# Patient Record
Sex: FEMALE | Race: WHITE | NOT HISPANIC OR LATINO | Employment: FULL TIME | URBAN - METROPOLITAN AREA
[De-identification: names, ages, dates, MRNs, and addresses within clinical notes are randomized per-mention and may not be internally consistent; named-entity substitution may affect disease eponyms.]

---

## 2019-03-01 ENCOUNTER — OFFICE VISIT - HEALTHEAST (OUTPATIENT)
Dept: CARDIOLOGY | Facility: CLINIC | Age: 59
End: 2019-03-01

## 2019-03-01 DIAGNOSIS — R07.9 CHEST PAIN, UNSPECIFIED TYPE: ICD-10-CM

## 2019-03-01 ASSESSMENT — MIFFLIN-ST. JEOR: SCORE: 1160.56

## 2021-05-29 ENCOUNTER — RECORDS - HEALTHEAST (OUTPATIENT)
Dept: ADMINISTRATIVE | Facility: CLINIC | Age: 61
End: 2021-05-29

## 2021-06-02 VITALS — BODY MASS INDEX: 21.66 KG/M2 | HEIGHT: 65 IN | WEIGHT: 130 LBS

## 2021-06-27 NOTE — PROGRESS NOTES
Progress Notes by Bella Mcdowell MD at 3/1/2019  8:50 AM     Author: Bella Mcdowell MD Service: -- Author Type: Physician    Filed: 3/1/2019  9:09 AM Encounter Date: 3/1/2019 Status: Signed    : Bella Mcdowell MD (Physician)           Click to link to [facility]     [facility] HE[facility] NOTE    Thank you, Dr. Jurado, for asking the [facility] team to see Ms. eMlody Anne in the rapid access clinic to evaluate intermittent chest pain.    Assessment/Recommendations   Assessment:    1.  Intermittent chest pain, etiology unclear.  Patient reports no clear association with physical activity and in fact has noted no decline in her exercise capacity despite high level of exercise 3-5 days/week.  No pleuritic component to the discomfort or aggravation by movement or breathing.  She denies any significant stressors.  There is a family history of early heart disease with her father dying of a fatal MI at the age of 57.  I did offer a stress echo study although at this point she would like to hold off and will follow up with her primary provider.    Plan:  1.  Follow-up with primary physician  2.  Consider stress echo study if symptoms persist       History of Present Illness    Ms. Melody Anne is a 58 y.o. female with no significant past medical history but family history of early coronary artery disease in her father who  of an MI at the age of 57 who was seen in the ED recently for evaluation of chest discomfort.  Prior to being seen 1 week ago, she had had 3 days of intermittent chest discomfort which she described as a pressure or tightness in her chest as well as her left arm.  No associated nausea, vomiting or shortness of breath.  At the time she was seen in the ED, her EKG showed no acute changes and troponin level was within normal limits.  She was subsequently discharged to be seen here in rapid access clinic.  In the last week, she has had only infrequent episodes of discomfort.   Again no decline in her exercise capacity.    ECG (personally reviewed): ECG again demonstrates normal sinus rhythm without acute changes.    Cardiac Imaging Studies (personally reviewed): No recent cardiac imaging     Physical Examination Review of Systems   Vitals:    03/01/19 0840   BP: 102/70   Pulse: 76   Resp: 16     Body mass index is 21.63 kg/m .  Wt Readings from Last 3 Encounters:   03/01/19 130 lb (59 kg)   02/22/19 129 lb (58.5 kg)     General Appearance:   Awake, Alert, No acute distress.   HEENT:  No scleral icterus; the mucous membranes were pink and moist.   Neck: No cervical bruits or jugular venous distention    Chest: The spine was straight. The chest was symmetric.   Lungs:   Respirations unlabored; the lungs are clear to auscultation. No wheezing   Cardiovascular:    Regular rate and rhythm.  S1, S2 normal.  No murmur, gallop or rub   Abdomen:  No organomegaly, masses, bruits, or tenderness. Bowels sounds are present   Extremities:  No peripheral edema, clubbing or cyanosis.   Skin: No xanthelasma. Warm, Dry.   Musculoskeletal: No tenderness.   Neurologic: Mood and affect are appropriate.    General: WNL  Eyes: WNL  Ears/Nose/Throat: WNL  Lungs: WNL  Heart: Chest Pain, Irregular Heartbeat  Stomach: WNL  Bladder: WNL  Muscle/Joints: WNL  Skin: WNL  Nervous System: WNL  Mental Health: WNL     Blood: WNL     Medical History  Surgical History Family History Social History   No past medical history on file. No past surgical history on file. Family History   Problem Relation Age of Onset   ? Acute Myocardial Infarction Father 57   ? Alcohol abuse Father     Social History     Socioeconomic History   ? Marital status:      Spouse name: Not on file   ? Number of children: Not on file   ? Years of education: Not on file   ? Highest education level: Not on file   Occupational History   ? Not on file   Social Needs   ? Financial resource strain: Not on file   ? Food insecurity:     Worry: Not on  file     Inability: Not on file   ? Transportation needs:     Medical: Not on file     Non-medical: Not on file   Tobacco Use   ? Smoking status: Former Smoker   ? Smokeless tobacco: Never Used   Substance and Sexual Activity   ? Alcohol use: Yes     Alcohol/week: 1.2 oz     Types: 2 Glasses of wine per week   ? Drug use: No   ? Sexual activity: Not on file   Lifestyle   ? Physical activity:     Days per week: Not on file     Minutes per session: Not on file   ? Stress: Not on file   Relationships   ? Social connections:     Talks on phone: Not on file     Gets together: Not on file     Attends Yarsanism service: Not on file     Active member of club or organization: Not on file     Attends meetings of clubs or organizations: Not on file     Relationship status: Not on file   ? Intimate partner violence:     Fear of current or ex partner: Not on file     Emotionally abused: Not on file     Physically abused: Not on file     Forced sexual activity: Not on file   Other Topics Concern   ? Not on file   Social History Narrative   ? Not on file          Medications  Allergies   No current outpatient medications on file.     No current facility-administered medications for this visit.       No Known Allergies      Lab Results    Chemistry/lipid CBC Cardiac Enzymes/BNP/TSH/INR   Lab Results   Component Value Date    CREATININE 0.80 02/22/2019    BUN 17 02/22/2019    K 3.6 02/22/2019     02/22/2019     02/22/2019    CO2 24 02/22/2019    Lab Results   Component Value Date    WBC 8.2 02/22/2019    HGB 12.7 02/22/2019    HCT 38.4 02/22/2019    MCV 91 02/22/2019     02/22/2019    Lab Results   Component Value Date    TROPONINI <0.01 02/22/2019    BNP 13 02/22/2019